# Patient Record
Sex: FEMALE
[De-identification: names, ages, dates, MRNs, and addresses within clinical notes are randomized per-mention and may not be internally consistent; named-entity substitution may affect disease eponyms.]

---

## 2021-12-14 PROBLEM — Z00.00 ENCOUNTER FOR PREVENTIVE HEALTH EXAMINATION: Status: ACTIVE | Noted: 2021-12-14

## 2021-12-23 ENCOUNTER — APPOINTMENT (OUTPATIENT)
Dept: ORTHOPEDIC SURGERY | Facility: CLINIC | Age: 41
End: 2021-12-23
Payer: COMMERCIAL

## 2021-12-23 ENCOUNTER — NON-APPOINTMENT (OUTPATIENT)
Age: 41
End: 2021-12-23

## 2021-12-23 VITALS
BODY MASS INDEX: 23.9 KG/M2 | DIASTOLIC BLOOD PRESSURE: 76 MMHG | WEIGHT: 140 LBS | HEIGHT: 64 IN | HEART RATE: 71 BPM | SYSTOLIC BLOOD PRESSURE: 120 MMHG

## 2021-12-23 DIAGNOSIS — Z78.9 OTHER SPECIFIED HEALTH STATUS: ICD-10-CM

## 2021-12-23 DIAGNOSIS — M75.42 IMPINGEMENT SYNDROME OF LEFT SHOULDER: ICD-10-CM

## 2021-12-23 PROCEDURE — 99204 OFFICE O/P NEW MOD 45 MIN: CPT

## 2021-12-23 PROCEDURE — 73030 X-RAY EXAM OF SHOULDER: CPT | Mod: LT

## 2021-12-23 RX ORDER — MELOXICAM 7.5 MG/1
7.5 TABLET ORAL DAILY
Qty: 21 | Refills: 2 | Status: COMPLETED | COMMUNITY
Start: 2021-12-23 | End: 2022-02-24

## 2021-12-23 NOTE — DISCUSSION/SUMMARY
[de-identified] : ANA SHAW is a 40 year female being seen for initial visit L shoulder pain. She reports intermittent pain since past 3 months, and denies any specific RADHA. She localizes most of her pain over anterolateral aspect of the shoulder. She reports radiating pain down to elbow. She is RHD, and attributes most of her pain to weight training. She has most pain with lateral movements such as holding a door, or putting clothes on. She has lowered her weight training regime since pain began, and noticing some relief. She has not perform PT for this. She occasionally takes NSAIDs that provide temporary relief. \par \par We had a thorough discussion regarding the nature of her pain, the pathophysiology, as well as all treatment options. After review of pts normal radiographs and her clinical exam I believe her primary complaint to be biceps and RTC tendonitis of which conservative measures are indicated. A prescription of Meloxicam as directed with food to prevent GI upset, if occurs pt to D/C and call us at that time. Pt to perform PT as well 2x/week x 6-8 weeks and until we see her again. At that time we will see her for clinical reassessment. If she is pain free at that time she will see us in the future on an prn basis. If she is refractory we will consider a cortisone injection. Pt agrees to the above plan and all questions were answered. \par \par \par

## 2021-12-23 NOTE — HISTORY OF PRESENT ILLNESS
[Stable] : stable [___ mths] : [unfilled] month(s) ago [3] : a current pain level of 3/10 [4] : an average pain level of 4/10 [Lifting] : worsened by lifting [de-identified] : ANA SHAW is a 40 year female being seen for initial visit L shoulder pain. She reports intermittent pain since past 3 months, and denies any specific RADHA. She localizes most of her pain over anterolateral aspect of the shoulder. She reports radiating pain down to elbow. She is RHD, and attributes most of her pain to weight training. She has most pain with lateral movements such as holding a door, or putting clothes on. She has lowered her weight training regime since pain began, and noticing some relief. She has not perform PT for this. She occasionally takes NSAIDs that provide temporary relief.

## 2021-12-23 NOTE — ADDENDUM
[FreeTextEntry1] : Documented by Brian Le acting as a scribe for Dr. Barragan on 12/23/2021. \par \par All medical record entries made by the Scribe were at my, Dr. Barragan's, direction and\par personally dictated by me on 12/23/2021. I have reviewed the chart and agree that the record\par accurately reflects my personal performance of the history, physical exam, procedure and imaging.

## 2021-12-23 NOTE — PHYSICAL EXAM
[de-identified] : Physical Exam:\par General: Well appearing, no acute distress\par Neurologic: A&Ox3, No focal deficits\par Head: NCAT without abrasions, lacerations, or ecchymosis to head, face, or scalp\par Eyes: No scleral icterus, no gross abnormalities\par Respiratory: Equal chest wall expansion bilaterally, no accessory muscle use\par Lymphatic: No lymphadenopathy palpated\par Skin: Warm and dry\par Psychiatric: Normal mood and affect\par \par Left Shoulder\par ·	Inspection/Palpation: no swelling or deformities. TTP biceps groove. Tension noted at Left trap. \par ·	Range of Motion: no crepitus with ROM; Active FF 0 - 180 w/ hitching; ER at side 0 - 50; IR to T7; \par ·	Strength: forward elevation in scapular plane 4/5, internal rotation 4/5, external rotation 4/5, adduction 4/5 and abduction 4/5\par ·	Stability: no joint instability on provocative testing\par ·	Tests: Greenwood test positive, Neer positive, positive drop arm test secondary to pain, bear hug test negative, Napolean sign negative, cross arm adduction negative, lift off sign positive, hornblowers sign negative, speeds test positive, Yergason's test negative, bicipital groove tenderness, Zamora's Active Compression test POS, whipple test POS, bicep's load II test negative, negative uppercut test. \par \par Right Shoulder\par ·	Inspection/Palpation: no tenderness, swelling or deformities\par ·	Range of Motion: full and painless in all planes, no crepitus\par ·	Strength: forward elevation in scapular plane 5/5, internal rotation 5/5, external rotation 5/5, adduction 5/5 and abduction 5/5\par ·	Stability: no joint instability on provocative testing\par ·	Tests: Greenwood test negative, Neer sign negative, negative drop arm test secondary to pain, bear hug test negative, Napolean sign negative, cross arm adduction negative, lift off sign positive, hornblowers sign negative, speeds test negative, Yergason's test negative, no bicipital groove tenderness, Zamora's Active Compression test negative  [de-identified] : The following radiographs were ordered and read by me during this patients visit. I reviewed each radiograph in detail with the patient and discussed the findings as highlighted below. \par \par 4 views of the Left shoulder show no fracture, dislocation or bony lesions. There is no evidence of degenerative change in the glenohumeral and acromioclavicular joints with maintenance of the joint space. Otherwise unremarkable.